# Patient Record
Sex: MALE | Race: WHITE | URBAN - METROPOLITAN AREA
[De-identification: names, ages, dates, MRNs, and addresses within clinical notes are randomized per-mention and may not be internally consistent; named-entity substitution may affect disease eponyms.]

---

## 2018-07-02 ENCOUNTER — TELEPHONE (OUTPATIENT)
Dept: RHEUMATOLOGY | Age: 28
End: 2018-07-02

## 2018-07-02 NOTE — TELEPHONE ENCOUNTER
----- Message from Lex Patch sent at 7/2/2018 10:33 AM EDT -----  Regarding: /Refill  Pt is requesting a medication refill on \"Leflunomide 20 mg tab\" [facility] [phone]. Best contact number is [phone] or [phone]. Adequate: hears normal conversation without difficulty

## 2018-10-10 ENCOUNTER — OFFICE VISIT (OUTPATIENT)
Dept: INTERNAL MEDICINE CLINIC | Age: 28
End: 2018-10-10

## 2018-10-10 VITALS
HEART RATE: 56 BPM | DIASTOLIC BLOOD PRESSURE: 62 MMHG | OXYGEN SATURATION: 98 % | BODY MASS INDEX: 22.86 KG/M2 | TEMPERATURE: 98.7 F | HEIGHT: 66 IN | RESPIRATION RATE: 18 BRPM | SYSTOLIC BLOOD PRESSURE: 97 MMHG | WEIGHT: 142.25 LBS

## 2018-10-10 DIAGNOSIS — Z23 IMMUNIZATION DUE: ICD-10-CM

## 2018-10-10 DIAGNOSIS — E78.01 FAMILIAL HYPERCHOLESTEROLEMIA: ICD-10-CM

## 2018-10-10 DIAGNOSIS — Z00.00 WELL ADULT EXAM: Primary | ICD-10-CM

## 2018-10-10 RX ORDER — SCOPALAMINE 1 MG/3D
PATCH, EXTENDED RELEASE TRANSDERMAL
Refills: 2 | COMMUNITY
Start: 2018-09-01 | End: 2018-10-10 | Stop reason: ALTCHOICE

## 2018-10-10 NOTE — MR AVS SNAPSHOT
[facility] 
 
 
 [address] [facility] [address] [id number] Patient: Rachelle Hurtdao MRN: [id number] :1990 Visit Information Date & Time Provider Department Dept. Phone Encounter #  
 10/10/2018 11:00 AM Robbi Hawthorne MD [facility] [id number] Upcoming Health Maintenance Date Due DTaP/Tdap/Td series (1 - Tdap) 2010 Influenza Age 5 to Adult 2018 Allergies as of 10/10/2018  Review Complete On: 10/10/2018 By: Robbi Hawthorne MD  
  
 Severity Noted Reaction Type Reactions Prednisolone  10/10/2018    Other (comments) Eye Problem Current Immunizations  Never Reviewed No immunizations on file. Not reviewed this visit You Were Diagnosed With   
  
 Codes Comments Immunization due    -  Primary ICD-10-CM: Z23 ICD-9-CM: V05.9 Vitals BP Pulse Temp Resp Height(growth percentile) Weight(growth percentile) 97/62 (BP 1 Location: Left arm, BP Patient Position: Sitting) (!) 56 98.7 °F (37.1 °C) (Oral) 18 5' 6\" (1.676 m) 142 lb 4 oz (64.5 kg) SpO2 BMI Smoking Status 98% 22.96 kg/m2 Never Smoker BMI and BSA Data Body Mass Index Body Surface Area  
 22.96 kg/m 2 1.73 m 2 Preferred Pharmacy Pharmacy Name Phone [id number] [phone] Your Updated Medication List  
  
Notice  As of 10/10/2018 12:16 PM  
 You have not been prescribed any medications. Introducing [facility]! Paul Sarabia introduces [employer] patient portal. Now you can access parts of your medical record, email your doctor's office, and request medication refills online. 1. In your internet browser, go to [email or web address] 2. Click on the First Time User? Click Here link in the Sign In box. You will see the New Member Sign Up page. 3. Enter your [employer] Access Code exactly as it appears below. You will not need to use this code after youve completed the sign-up process. If you do not sign up before the expiration date, you must request a new code. · [employer] Access Code: [email or web address] Expires: 1/8/2019 12:16 PM 
 
4. Enter the last four digits of your Social Security Number (xxxx) and Date of Birth (mm/dd/yyyy) as indicated and click Submit. You will be taken to the next sign-up page. 5. Create a [employer] ID. This will be your [employer] login ID and cannot be changed, so think of one that is secure and easy to remember. 6. Create a [employer] password. You can change your password at any time. 7. Enter your Password Reset Question and Answer. This can be used at a later time if you forget your password. 8. Enter your e-mail address. You will receive e-mail notification when new information is available in [address]. 9. Click Sign Up. You can now view and download portions of your medical record. 10. Click the Download Summary menu link to download a portable copy of your medical information. If you have questions, please visit the Frequently Asked Questions section of the [employer] website. Remember, [employer] is NOT to be used for urgent needs. For medical emergencies, dial 911. Now available from your iPhone and Android! Please provide this summary of care documentation to your next provider. If you have any questions after today's visit, please call [phone].

## 2018-10-10 NOTE — PROGRESS NOTES
Jeniffer Moreau is a 28 y.o. male and presents with [facility] Angel Ike Patient presents to [facility]. He has a family history of hyperlipidemia on his father's side. He had labs done recently at his work and his LDL was noted to be 17 3. Subjective: 
Cardiovascular Review: 
The patient has hyperlipidemia. Diet and Lifestyle: generally follows a low fat low cholesterol diet Home BP Monitoring: is not measured at home. Pertinent ROS: no TIA's, no chest pain on exertion, no dyspnea on exertion, no swelling of ankles. Patient is very active with lifting weights and he also does [email or web address] regularly. He has no chest pain or shortness of breath. 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 [address] 8-9/10 Not depressed or anxious Has work stress Hours sleeping 7-8 hours/night, well rested He reports the month of January through March can be stressful. Review of Systems Constitutional: negative for fevers, chills, anorexia and weight loss Eyes:   negative for visual disturbance and irritation ENT:   negative for tinnitus,sore throat,nasal congestion,ear pains. hoarseness Respiratory:  negative for cough, hemoptysis, dyspnea,wheezing CV:   negative for chest pain, palpitations, lower extremity edema GI:   negative for nausea, vomiting, diarrhea, abdominal pain,melena Endo:               negative for polyuria,polydipsia,polyphagia,heat intolerance Genitourinary: negative for frequency, dysuria and hematuria Integument:  negative for rash and pruritus Hematologic:  negative for easy bruising and gum/nose bleeding Musculoskel: negative for myalgias, arthralgias, back pain, muscle weakness, joint pain Neurological:  negative for headaches, dizziness, vertigo, memory problems and gait Behavl/Psych: negative for feelings of anxiety, depression, mood changes Past Medical History:  
Diagnosis Date  Hypercholesterolemia History reviewed. No pertinent surgical history. Social History Social History  Marital status: UNKNOWN Spouse name: N/A  
 Number of children: N/A  
 Years of education: N/A Social History Main Topics  Smoking status: Never Smoker  Smokeless tobacco: Never Used  Alcohol use 6.6 - 7.8 oz/week 10 - 12 Cans of beer, 1 Shots of liquor per week Comment: 1-2/weekend  Drug use: No  
 Sexual activity: Yes  
  Partners: Female Other Topics Concern  None Social History Narrative Full time Financial reporting Accounting Not  No children Orange theory and weight lifting Family History Problem Relation Age of Onset  Elevated Lipids Father  Elevated Lipids Paternal Grandmother Allergies Allergen Reactions  Prednisolone Other (comments) Eye Problem Objective: 
Visit Vitals  BP 97/62 (BP 1 Location: Left arm, BP Patient Position: Sitting)  Pulse (!) 56  Temp 98.7 °F (37.1 °C) (Oral)  Resp 18  Ht 5' 6\" (1.676 m)  Wt 142 lb 4 oz (64.5 kg)  SpO2 98%  BMI 22.96 kg/m2 Physical Exam:  
General appearance - alert, well appearing, and in no distress Mental status - alert, oriented to person, place, and time EYE-DASH, EOMI, corneas normal, no foreign bodies, visual acuity normal both eyes, ENT-ENT exam normal, no neck nodes or sinus tenderness Nose - normal and patent, no erythema, discharge or polyps Mouth - mucous membranes moist, pharynx normal without lesions Neck - supple, no significant adenopathy Chest - clear to auscultation, no wheezes, rales or rhonchi, symmetric air entry Heart - normal rate, regular rhythm, normal S1, S2, no murmurs, rubs, clicks or gallops Abdomen - soft, nontender, nondistended, no masses or organomegaly Lymph- no adenopathy palpable Ext-peripheral pulses normal, no pedal edema, no clubbing or cyanosis Skin-Warm and dry. no hyperpigmentation, vitiligo, or suspicious lesions Neuro -alert, oriented, normal speech, no focal findings or movement disorder noted Neck-normal C-spine, no tenderness, full ROM without pain No results found for this or any previous visit. Prevention Cardiovascular profile Family hx Exercisin+/times orange theory and weights Blood pressure: 
Health healthy diet: 
Diabetes: 
Cholesterol: 
Renal function: 
 
 
Cancer risk profile no symptoms PSA Lung Colonoscopy Skin nonhealing in 2 weeks Thyroid sx No weight change, no constipation or diarrhea Osteopenia prevention Calcium 1000mg/day yes Vitamin D 800iu/day yes Mental health scale: Very good Depression Anxiety Sleep # of hours: 
Energy Level:     
 
Immunizations TDAP Pneumonia vaccine Flu vaccine Shingles vaccine HPV Diagnoses and all orders for this visit: 
 
1. Well adult exam 
Patient presents in overall good health. We discussed his cholesterol labs and his LDL. He has a family history of cholesterol in his father side. There are no strokes or heart attacks. I discussed with him a eating a heart healthy diet. He does not need to be on medication right now. If his LDL is greater than 190 we may consider putting him on this. We can recheck every year. Behaviorally he is doing very well with exercise and diet. I also discussed with him to take preventive and behavioral measures prior to stressful times at his work. I have recommended that he get more sleep optimize his exercise before his January through March months at his work. 2. Familial hypercholesterolemia Continue off medication his LDL is less than 190, behavioral recommendations given 3. Immunization due -     TETANUS, DIPHTHERIA TOXOIDS AND ACELLULAR PERTUSSIS VACCINE (TDAP), IN INDIVIDS. >=7, IM This note will not be viewable in [address] Follow-up in 1 year or as needed